# Patient Record
Sex: FEMALE | Race: WHITE | NOT HISPANIC OR LATINO | URBAN - METROPOLITAN AREA
[De-identification: names, ages, dates, MRNs, and addresses within clinical notes are randomized per-mention and may not be internally consistent; named-entity substitution may affect disease eponyms.]

---

## 2017-03-10 ENCOUNTER — OFFICE VISIT - HEALTHEAST (OUTPATIENT)
Dept: FAMILY MEDICINE | Facility: CLINIC | Age: 47
End: 2017-03-10

## 2017-03-10 DIAGNOSIS — J32.9 SINUSITIS: ICD-10-CM

## 2017-03-10 ASSESSMENT — MIFFLIN-ST. JEOR: SCORE: 1278.02

## 2017-03-16 ENCOUNTER — OFFICE VISIT - HEALTHEAST (OUTPATIENT)
Dept: FAMILY MEDICINE | Facility: CLINIC | Age: 47
End: 2017-03-16

## 2017-03-16 DIAGNOSIS — Z00.00 ROUTINE GENERAL MEDICAL EXAMINATION AT A HEALTH CARE FACILITY: ICD-10-CM

## 2017-03-16 DIAGNOSIS — Z13.1 DIABETES MELLITUS SCREENING: ICD-10-CM

## 2017-03-16 DIAGNOSIS — Z13.220 SCREENING CHOLESTEROL LEVEL: ICD-10-CM

## 2017-03-16 LAB
CHOLEST SERPL-MCNC: 189 MG/DL
FASTING STATUS PATIENT QL REPORTED: YES
HDLC SERPL-MCNC: 62 MG/DL
LDLC SERPL CALC-MCNC: 108 MG/DL
TRIGL SERPL-MCNC: 95 MG/DL

## 2017-03-16 ASSESSMENT — MIFFLIN-ST. JEOR: SCORE: 1282.56

## 2018-09-19 ENCOUNTER — OFFICE VISIT - HEALTHEAST (OUTPATIENT)
Dept: FAMILY MEDICINE | Facility: CLINIC | Age: 48
End: 2018-09-19

## 2018-09-19 DIAGNOSIS — G47.00 INSOMNIA: ICD-10-CM

## 2018-09-19 DIAGNOSIS — F32.0 MILD MAJOR DEPRESSION (H): ICD-10-CM

## 2018-09-19 DIAGNOSIS — Z00.00 ROUTINE GENERAL MEDICAL EXAMINATION AT A HEALTH CARE FACILITY: ICD-10-CM

## 2018-09-19 DIAGNOSIS — F41.9 ANXIETY: ICD-10-CM

## 2018-09-19 LAB
CHOLEST SERPL-MCNC: 182 MG/DL
FASTING STATUS PATIENT QL REPORTED: YES
FASTING STATUS PATIENT QL REPORTED: YES
GLUCOSE BLD-MCNC: 78 MG/DL (ref 70–99)
HDLC SERPL-MCNC: 56 MG/DL
LDLC SERPL CALC-MCNC: 110 MG/DL
TRIGL SERPL-MCNC: 78 MG/DL
TSH SERPL DL<=0.005 MIU/L-ACNC: 1.79 UIU/ML (ref 0.3–5)

## 2018-09-19 ASSESSMENT — MIFFLIN-ST. JEOR: SCORE: 1284.83

## 2018-09-20 LAB
HPV SOURCE: NORMAL
HUMAN PAPILLOMA VIRUS 16 DNA: NEGATIVE
HUMAN PAPILLOMA VIRUS 18 DNA: NEGATIVE
HUMAN PAPILLOMA VIRUS FINAL DIAGNOSIS: NORMAL
HUMAN PAPILLOMA VIRUS OTHER HR: NEGATIVE
SPECIMEN DESCRIPTION: NORMAL

## 2018-09-28 LAB
BKR LAB AP ABNORMAL BLEEDING: NO
BKR LAB AP BIRTH CONTROL/HORMONES: NORMAL
BKR LAB AP CERVICAL APPEARANCE: NORMAL
BKR LAB AP GYN ADEQUACY: NORMAL
BKR LAB AP GYN INTERPRETATION: NORMAL
BKR LAB AP HPV REFLEX: NORMAL
BKR LAB AP LMP: NORMAL
BKR LAB AP PATIENT STATUS: NORMAL
BKR LAB AP PREVIOUS ABNORMAL: NORMAL
BKR LAB AP PREVIOUS NORMAL: 2013
HIGH RISK?: NO
PATH REPORT.COMMENTS IMP SPEC: NORMAL
RESULT FLAG (HE HISTORICAL CONVERSION): NORMAL

## 2018-10-08 ENCOUNTER — HOSPITAL ENCOUNTER (OUTPATIENT)
Dept: MAMMOGRAPHY | Facility: CLINIC | Age: 48
Discharge: HOME | End: 2018-10-08
Attending: FAMILY MEDICINE

## 2018-10-08 DIAGNOSIS — Z12.31 VISIT FOR SCREENING MAMMOGRAM: ICD-10-CM

## 2018-10-16 ENCOUNTER — OFFICE VISIT - HEALTHEAST (OUTPATIENT)
Dept: FAMILY MEDICINE | Facility: CLINIC | Age: 48
End: 2018-10-16

## 2018-10-16 DIAGNOSIS — G47.00 INSOMNIA: ICD-10-CM

## 2018-10-16 DIAGNOSIS — F32.0 MILD MAJOR DEPRESSION (H): ICD-10-CM

## 2018-10-16 DIAGNOSIS — F41.9 ANXIETY: ICD-10-CM

## 2018-11-14 ENCOUNTER — OFFICE VISIT - HEALTHEAST (OUTPATIENT)
Dept: FAMILY MEDICINE | Facility: CLINIC | Age: 48
End: 2018-11-14

## 2018-11-14 DIAGNOSIS — F51.01 PRIMARY INSOMNIA: ICD-10-CM

## 2018-11-14 DIAGNOSIS — F32.0 MILD MAJOR DEPRESSION (H): ICD-10-CM

## 2018-11-14 DIAGNOSIS — F41.9 ANXIETY: ICD-10-CM

## 2018-12-27 ENCOUNTER — COMMUNICATION - HEALTHEAST (OUTPATIENT)
Dept: FAMILY MEDICINE | Facility: CLINIC | Age: 48
End: 2018-12-27

## 2019-02-13 ENCOUNTER — OFFICE VISIT - HEALTHEAST (OUTPATIENT)
Dept: FAMILY MEDICINE | Facility: CLINIC | Age: 49
End: 2019-02-13

## 2019-02-13 DIAGNOSIS — F51.01 PRIMARY INSOMNIA: ICD-10-CM

## 2019-02-13 DIAGNOSIS — F41.9 ANXIETY: ICD-10-CM

## 2019-02-13 DIAGNOSIS — F32.0 MILD MAJOR DEPRESSION (H): ICD-10-CM

## 2019-03-06 ENCOUNTER — COMMUNICATION - HEALTHEAST (OUTPATIENT)
Dept: FAMILY MEDICINE | Facility: CLINIC | Age: 49
End: 2019-03-06

## 2019-03-06 DIAGNOSIS — F41.9 ANXIETY: ICD-10-CM

## 2019-03-06 DIAGNOSIS — F32.0 MILD MAJOR DEPRESSION (H): ICD-10-CM

## 2020-03-26 ENCOUNTER — COMMUNICATION - HEALTHEAST (OUTPATIENT)
Dept: FAMILY MEDICINE | Facility: CLINIC | Age: 50
End: 2020-03-26

## 2020-03-26 DIAGNOSIS — F41.9 ANXIETY: ICD-10-CM

## 2020-03-26 DIAGNOSIS — F32.0 MILD MAJOR DEPRESSION (H): ICD-10-CM

## 2020-04-13 ENCOUNTER — COMMUNICATION - HEALTHEAST (OUTPATIENT)
Dept: FAMILY MEDICINE | Facility: CLINIC | Age: 50
End: 2020-04-13

## 2020-04-14 ENCOUNTER — OFFICE VISIT - HEALTHEAST (OUTPATIENT)
Dept: FAMILY MEDICINE | Facility: CLINIC | Age: 50
End: 2020-04-14

## 2020-04-14 DIAGNOSIS — F32.0 MILD MAJOR DEPRESSION (H): ICD-10-CM

## 2020-04-14 DIAGNOSIS — F41.9 ANXIETY: ICD-10-CM

## 2020-04-14 DIAGNOSIS — F51.01 PRIMARY INSOMNIA: ICD-10-CM

## 2020-04-14 ASSESSMENT — ANXIETY QUESTIONNAIRES
IF YOU CHECKED OFF ANY PROBLEMS ON THIS QUESTIONNAIRE, HOW DIFFICULT HAVE THESE PROBLEMS MADE IT FOR YOU TO DO YOUR WORK, TAKE CARE OF THINGS AT HOME, OR GET ALONG WITH OTHER PEOPLE: NOT DIFFICULT AT ALL
6. BECOMING EASILY ANNOYED OR IRRITABLE: SEVERAL DAYS
5. BEING SO RESTLESS THAT IT IS HARD TO SIT STILL: NOT AT ALL
7. FEELING AFRAID AS IF SOMETHING AWFUL MIGHT HAPPEN: NOT AT ALL
4. TROUBLE RELAXING: NOT AT ALL
3. WORRYING TOO MUCH ABOUT DIFFERENT THINGS: SEVERAL DAYS
1. FEELING NERVOUS, ANXIOUS, OR ON EDGE: MORE THAN HALF THE DAYS
GAD7 TOTAL SCORE: 5
2. NOT BEING ABLE TO STOP OR CONTROL WORRYING: SEVERAL DAYS

## 2020-04-14 ASSESSMENT — PATIENT HEALTH QUESTIONNAIRE - PHQ9: SUM OF ALL RESPONSES TO PHQ QUESTIONS 1-9: 3

## 2021-03-25 ENCOUNTER — COMMUNICATION - HEALTHEAST (OUTPATIENT)
Dept: FAMILY MEDICINE | Facility: CLINIC | Age: 51
End: 2021-03-25

## 2021-04-05 ENCOUNTER — COMMUNICATION - HEALTHEAST (OUTPATIENT)
Dept: FAMILY MEDICINE | Facility: CLINIC | Age: 51
End: 2021-04-05

## 2021-04-05 ENCOUNTER — OFFICE VISIT - HEALTHEAST (OUTPATIENT)
Dept: FAMILY MEDICINE | Facility: CLINIC | Age: 51
End: 2021-04-05

## 2021-04-05 DIAGNOSIS — F32.0 MILD MAJOR DEPRESSION (H): ICD-10-CM

## 2021-04-05 DIAGNOSIS — Z12.11 COLON CANCER SCREENING: ICD-10-CM

## 2021-04-05 DIAGNOSIS — F41.9 ANXIETY: ICD-10-CM

## 2021-04-05 DIAGNOSIS — F51.01 PRIMARY INSOMNIA: ICD-10-CM

## 2021-04-05 ASSESSMENT — ANXIETY QUESTIONNAIRES
1. FEELING NERVOUS, ANXIOUS, OR ON EDGE: MORE THAN HALF THE DAYS
GAD7 TOTAL SCORE: 11
IF YOU CHECKED OFF ANY PROBLEMS ON THIS QUESTIONNAIRE, HOW DIFFICULT HAVE THESE PROBLEMS MADE IT FOR YOU TO DO YOUR WORK, TAKE CARE OF THINGS AT HOME, OR GET ALONG WITH OTHER PEOPLE: EXTREMELY DIFFICULT
3. WORRYING TOO MUCH ABOUT DIFFERENT THINGS: NEARLY EVERY DAY
7. FEELING AFRAID AS IF SOMETHING AWFUL MIGHT HAPPEN: NOT AT ALL
4. TROUBLE RELAXING: SEVERAL DAYS
2. NOT BEING ABLE TO STOP OR CONTROL WORRYING: MORE THAN HALF THE DAYS
5. BEING SO RESTLESS THAT IT IS HARD TO SIT STILL: NOT AT ALL
6. BECOMING EASILY ANNOYED OR IRRITABLE: NEARLY EVERY DAY

## 2021-04-05 ASSESSMENT — PATIENT HEALTH QUESTIONNAIRE - PHQ9: SUM OF ALL RESPONSES TO PHQ QUESTIONS 1-9: 6

## 2021-04-13 ENCOUNTER — OFFICE VISIT - HEALTHEAST (OUTPATIENT)
Dept: FAMILY MEDICINE | Facility: CLINIC | Age: 51
End: 2021-04-13

## 2021-04-13 DIAGNOSIS — F41.9 ANXIETY: ICD-10-CM

## 2021-04-13 DIAGNOSIS — N95.1 MENOPAUSAL SYNDROME (HOT FLASHES): ICD-10-CM

## 2021-04-13 DIAGNOSIS — F32.0 MILD MAJOR DEPRESSION (H): ICD-10-CM

## 2021-04-13 DIAGNOSIS — F51.01 PRIMARY INSOMNIA: ICD-10-CM

## 2021-04-13 DIAGNOSIS — Z00.00 ROUTINE GENERAL MEDICAL EXAMINATION AT A HEALTH CARE FACILITY: ICD-10-CM

## 2021-04-13 LAB
CHOLEST SERPL-MCNC: 198 MG/DL
FASTING STATUS PATIENT QL REPORTED: YES
FASTING STATUS PATIENT QL REPORTED: YES
GLUCOSE BLD-MCNC: 78 MG/DL (ref 70–125)
HDLC SERPL-MCNC: 67 MG/DL
LDLC SERPL CALC-MCNC: 116 MG/DL
TRIGL SERPL-MCNC: 74 MG/DL

## 2021-04-13 ASSESSMENT — ANXIETY QUESTIONNAIRES
4. TROUBLE RELAXING: NOT AT ALL
2. NOT BEING ABLE TO STOP OR CONTROL WORRYING: NOT AT ALL
1. FEELING NERVOUS, ANXIOUS, OR ON EDGE: SEVERAL DAYS
GAD7 TOTAL SCORE: 3
7. FEELING AFRAID AS IF SOMETHING AWFUL MIGHT HAPPEN: NOT AT ALL
3. WORRYING TOO MUCH ABOUT DIFFERENT THINGS: SEVERAL DAYS
6. BECOMING EASILY ANNOYED OR IRRITABLE: SEVERAL DAYS
5. BEING SO RESTLESS THAT IT IS HARD TO SIT STILL: NOT AT ALL
IF YOU CHECKED OFF ANY PROBLEMS ON THIS QUESTIONNAIRE, HOW DIFFICULT HAVE THESE PROBLEMS MADE IT FOR YOU TO DO YOUR WORK, TAKE CARE OF THINGS AT HOME, OR GET ALONG WITH OTHER PEOPLE: NOT DIFFICULT AT ALL

## 2021-04-13 ASSESSMENT — PATIENT HEALTH QUESTIONNAIRE - PHQ9: SUM OF ALL RESPONSES TO PHQ QUESTIONS 1-9: 6

## 2021-04-13 ASSESSMENT — MIFFLIN-ST. JEOR: SCORE: 1243.16

## 2021-04-29 ENCOUNTER — RECORDS - HEALTHEAST (OUTPATIENT)
Dept: ADMINISTRATIVE | Facility: OTHER | Age: 51
End: 2021-04-29

## 2021-05-05 ENCOUNTER — COMMUNICATION - HEALTHEAST (OUTPATIENT)
Dept: FAMILY MEDICINE | Facility: CLINIC | Age: 51
End: 2021-05-05

## 2021-05-05 DIAGNOSIS — F51.01 PRIMARY INSOMNIA: ICD-10-CM

## 2021-05-05 DIAGNOSIS — N95.1 MENOPAUSAL SYNDROME (HOT FLASHES): ICD-10-CM

## 2021-05-12 ENCOUNTER — HOSPITAL ENCOUNTER (OUTPATIENT)
Dept: MAMMOGRAPHY | Facility: CLINIC | Age: 51
Discharge: HOME | End: 2021-05-12

## 2021-05-12 DIAGNOSIS — Z12.31 VISIT FOR SCREENING MAMMOGRAM: ICD-10-CM

## 2021-05-27 ASSESSMENT — PATIENT HEALTH QUESTIONNAIRE - PHQ9
SUM OF ALL RESPONSES TO PHQ QUESTIONS 1-9: 6
SUM OF ALL RESPONSES TO PHQ QUESTIONS 1-9: 6
SUM OF ALL RESPONSES TO PHQ QUESTIONS 1-9: 3

## 2021-05-28 ASSESSMENT — ANXIETY QUESTIONNAIRES
GAD7 TOTAL SCORE: 3
GAD7 TOTAL SCORE: 11
GAD7 TOTAL SCORE: 5

## 2021-05-30 VITALS — HEIGHT: 63 IN | WEIGHT: 151 LBS | BODY MASS INDEX: 26.75 KG/M2

## 2021-05-30 VITALS — BODY MASS INDEX: 25.8 KG/M2 | HEIGHT: 64 IN | WEIGHT: 151.13 LBS

## 2021-06-02 ENCOUNTER — RECORDS - HEALTHEAST (OUTPATIENT)
Dept: ADMINISTRATIVE | Facility: CLINIC | Age: 51
End: 2021-06-02

## 2021-06-02 ENCOUNTER — COMMUNICATION - HEALTHEAST (OUTPATIENT)
Dept: FAMILY MEDICINE | Facility: CLINIC | Age: 51
End: 2021-06-02

## 2021-06-02 VITALS — WEIGHT: 150.25 LBS | BODY MASS INDEX: 26.41 KG/M2

## 2021-06-02 VITALS — HEIGHT: 63 IN | BODY MASS INDEX: 27.02 KG/M2 | WEIGHT: 152.5 LBS

## 2021-06-02 VITALS — BODY MASS INDEX: 26.03 KG/M2 | WEIGHT: 148.13 LBS

## 2021-06-02 VITALS — BODY MASS INDEX: 26.01 KG/M2 | WEIGHT: 148 LBS

## 2021-06-02 DIAGNOSIS — F32.0 MILD MAJOR DEPRESSION (H): ICD-10-CM

## 2021-06-02 DIAGNOSIS — F41.9 ANXIETY: ICD-10-CM

## 2021-06-03 RX ORDER — VENLAFAXINE HYDROCHLORIDE 75 MG/1
CAPSULE, EXTENDED RELEASE ORAL
Qty: 90 CAPSULE | Refills: 0 | Status: SHIPPED | OUTPATIENT
Start: 2021-06-03 | End: 2021-08-09

## 2021-06-05 VITALS
HEART RATE: 59 BPM | SYSTOLIC BLOOD PRESSURE: 116 MMHG | HEIGHT: 63 IN | OXYGEN SATURATION: 100 % | WEIGHT: 144.19 LBS | BODY MASS INDEX: 25.55 KG/M2 | DIASTOLIC BLOOD PRESSURE: 80 MMHG

## 2021-06-07 NOTE — TELEPHONE ENCOUNTER
Who is calling:  Patient      Reason for Call:  Calling to confirm the TYPE of visit that will be conducted tomorrow, If video - Patient states , she has no idea how to link to that . Schedule states both video and televisit thus the confusion .   Please call patient     Date of last appointment with primary care:   Na    Okay to leave a detailed message: Yes

## 2021-06-07 NOTE — TELEPHONE ENCOUNTER
Reached out to patient and informed her she will receive a phone call prior to her appointment, and she will be given directions. No additional questions at this time. Frances Landers

## 2021-06-07 NOTE — PROGRESS NOTES
"Robyn Cross is a 50 y.o. female who is being evaluated via a billable telephone visit.      The patient has been notified of following:     \"This telephone visit will be conducted via a call between you and your physician/provider. We have found that certain health care needs can be provided without the need for a physical exam.  This service lets us provide the care you need with a short phone conversation.  If a prescription is necessary we can send it directly to your pharmacy.  If lab work is needed we can place an order for that and you can then stop by our lab to have the test done at a later time.    Telephone visits are billed at different rates depending on your insurance coverage. During this emergency period, for some insurers they may be billed the same as an in-person visit.  Please reach out to your insurance provider with any questions.    If during the course of the call the physician/provider feels a telephone visit is not appropriate, you will not be charged for this service.\"    Patient has given verbal consent to a Telephone visit? Yes    Additional provider notes:    Has not refilled med for couple months because was doing well  Until COVID19  Father passed away in January  Since then has been more anxious and not sleeping well  Called pharmacy and was able to get one month supply.  Has started taking it again and asking refills    Little interest or pleasure in doing things: Not at all  Feeling down, depressed, or hopeless: Not at all  Trouble falling or staying asleep, or sleeping too much: More than half the days  Feeling tired or having little energy: Not at all  Poor appetite or overeating: Several days  Feeling bad about yourself - or that you are a failure or have let yourself or your family down: Not at all  Trouble concentrating on things, such as reading the newspaper or watching television: Not at all  Moving or speaking so slowly that other people could have noticed. Or the " opposite - being so fidgety or restless that you have been moving around a lot more than usual: Not at all  Thoughts that you would be better off dead, or of hurting yourself in some way: Not at all  PHQ-9 Total Score: 3  If you checked off any problems, how difficult have these problems made it for you to do your work, take care of things at home, or get along with other people?: Not difficult at all    DONTAE-7 Screening Results:  Feeling nervous, anxious or on edge: 2 (2020  2:00 PM)  Not being able to stop or control worry: 1 (2020  2:00 PM)  Worrying too much about different things: 1 (2020  2:00 PM)  Trouble relaxin (2020  2:00 PM)  Being so restless that is is hard to sit still: 0 (2020  2:00 PM)  Becoming easily annnoyed or irritable: 1 (2020  2:00 PM)  Feeling afraid as if something awful might happen: 0 (2020  2:00 PM)  DONTAE-7 Total: 5 (2020  2:00 PM)  How difficult did these problems make it for you to do your work, take care of things at home or get along with other people? : Not difficult at all (2020  2:00 PM)  How difficult did these problems make it for you to do your work, take care of things at home or get along with other people? : Not difficult at all (2020  2:00 PM)      Assessment/Plan:  1. Mild major depression (H)  Refilled  Motivational interviewing was utilized today.  Modified cognitive behavioral therapy was performed with counseling on internal locus of control.   - venlafaxine (EFFEXOR-XR) 75 MG 24 hr capsule; TAKE 1 CAPSULE BY MOUTH  EVERY DAY  Dispense: 90 capsule; Refill: 3    2. Anxiety  Refilled  Motivational interviewing was utilized today.  Modified cognitive behavioral therapy was performed with counseling on internal locus of control.   - venlafaxine (EFFEXOR-XR) 75 MG 24 hr capsule; TAKE 1 CAPSULE BY MOUTH  EVERY DAY  Dispense: 90 capsule; Refill: 3    3. Primary insomnia  Continue with melatonin      Phone call duration:  25  minutes    Rufino Randle, CMA

## 2021-06-07 NOTE — TELEPHONE ENCOUNTER
Left message to call back for: Robyn   Information to relay to patient:  Please assist the patient with a telephone visit.  Thank you

## 2021-06-07 NOTE — TELEPHONE ENCOUNTER
RN cannot approve Refill Request    RN can NOT refill this medication Protocol failed and NO refill given. Last office visit: 2/13/2019 Dave Reis MD Last Physical: 9/19/2018 Last MTM visit: Visit date not found Last visit same specialty: 2/13/2019 Dave Reis MD.  Next visit within 3 mo: Visit date not found  Next physical within 3 mo: Visit date not found      Rhea Sanchez, [facility] Connection Triage/Med Refill 3/27/2020    Requested Prescriptions   Pending Prescriptions Disp Refills     venlafaxine (EFFEXOR-XR) 75 MG 24 hr capsule  0     Sig: Take by mouth daily.       Venlafaxine/Desvenlafaxine Refill Protocol Failed - 3/26/2020  8:20 AM        Failed - LFT or AST or ALT in last year     No results found for: ALBUMIN, BILITOT, BILIDIR, ALKPHOS, AST, ALT, PROT             Failed - Fasting lipid cascade in last year     Cholesterol   Date Value Ref Range Status   09/19/2018 182 <=199 mg/dL Final     Triglycerides   Date Value Ref Range Status   09/19/2018 78 <=149 mg/dL Final     HDL Cholesterol   Date Value Ref Range Status   09/19/2018 56 >=50 mg/dL Final     LDL Calculated   Date Value Ref Range Status   09/19/2018 110 <=129 mg/dL Final     Patient Fasting > 8hrs?   Date Value Ref Range Status   09/19/2018 Yes  Final   09/19/2018 Yes  Final             Failed - PCP or prescribing provider visit in last year     Last office visit with prescriber/PCP: 2/13/2019 Dave Reis MD OR same dept: Visit date not found OR same specialty: 2/13/2019 Dave Resi MD  Last physical: 9/19/2018 Last MTM visit: Visit date not found   Next visit within 3 mo: Visit date not found  Next physical within 3 mo: Visit date not found  Prescriber OR PCP: Dave Moss MD  Last diagnosis associated with med order: 1. Mild major depression (H)  - venlafaxine (EFFEXOR-XR) 75 MG 24 hr capsule; Take by mouth daily.; Refill: 0    2. Anxiety  - venlafaxine  (EFFEXOR-XR) 75 MG 24 hr capsule; Take by mouth daily.; Refill: 0    If protocol passes may refill for 12 months if within 3 months of last provider visit (or a total of 15 months).             Failed - Blood Pressure in last year     BP Readings from Last 1 Encounters:   02/13/19 110/64

## 2021-06-09 NOTE — PROGRESS NOTES
"Assessment/Plan:        Diagnoses and all orders for this visit:    Sinusitis    Other orders  -     azithromycin (ZITHROMAX Z-MOE) 250 MG tablet; Take 2 tablets (500 mg) on  Day 1,  followed by 1 tablet (250 mg) once daily on Days 2 through 5.  Dispense: 6 tablet; Refill: 0        We will start her on Z-Moe.  Discussed about medication use and side effects.  Increase yogurt intake.  Percussion measures, fluid hydration.  Recheck with PCP in a week if persistent or worse.  She was agreeable with the plans.  Subjective:    Patient ID: Robyn Cross is a 47 y.o. female.    HPI    Robyn is here with concerns about sinus infection.  She started with a chest congestion around 5 weeks ago, cough.  It then spread to her nose and is now congested.  Productive of yellowish to greenish secretions.  Denies any exposure to anyone ill that she is aware of.  She works in the school.  She denies any recent travel.  No shortness of breath.  No hemoptysis.  Ears feels clogged especially in the past 4 weeks.  She tried Tylenol Cold and flu, TheraFlu at night.  She also has allergies during the summer and uses Neti pot intermittently.  She tries to hydrate herself well.  Denies any history of smoking.  She felt feverish, chilled when it started.  It lasted for 3 days.    Review of Systems  As above otherwise negative.        Objective:    Physical Exam  Visit Vitals     /70 (Patient Site: Left Arm, Patient Position: Sitting, Cuff Size: Adult Regular)     Pulse 64     Temp 98.8  F (37.1  C) (Oral)     Ht 5' 3.25\" (1.607 m)     Wt 151 lb (68.5 kg)     LMP 02/20/2017 (Approximate)     SpO2 99%     Breastfeeding No     BMI 26.54 kg/m2       Vital signs noted above. AAO ×3.  HEENT no nasal discharge, moist oral mucosa. Ears:TMs intact bilateral, no fluid collection. Neck: Supple neck, nonpalpable cervical lymph nodes.  Lungs: Clear to auscultation bilateral.  Heart: S1-S2 regular rate and rhythm, no murmurs were noted.  " Abdomen:  with bowel sounds.  Extremities: No edema, pulses were full and equal.

## 2021-06-09 NOTE — PROGRESS NOTES
ASSESSMENT/PLAN:  1. Routine general medical examination at a health care facility  We discussed healthy lifestyle, nutrition, cardiovascular risk reduction, self care, safety, sunscreen, and seatbelt screening.  Health maintenance screening and immunizations reviewed with the patient.    2. Screening cholesterol level  - Lipid Cascade    3. Diabetes mellitus screening  - Glucose    4.  Sinusitis  Completed antibiotics and about 50% better  Continue with supportive cares and call if with questions    SUBJECTIVE:  Robyn Cross is a 47 y.o. female who is here for a health maintenance visit. Her blood pressure and pulse are within normal limits. Her BMI is 26. Her PAP smear is up to date. Due for a mammogram. She is fasting today.     Sinusitis: She was seen last week for a sinus infection, and was given azithromycin. She finished these, but does not feel completely better. She feels about 50% better. Her ears still feel clogged, and she feels like her throat is swollen, and she feels this while swallowing. She is also still have a significant amount of post nasal drip. She says that her congestion and rhinorrhea and sneezing is better. No body aches, fevers or chills. She did have some diarrhea on the antibiotics, but otherwise no nausea or stomach pain. No cough or sore throat. She does have allergies, and was unsure if her symptoms were allergies or infectious. 4-5 weeks ago, she had some chest congestion with coughing and sneezing, and she thinks that this went to sinuses. She does not take a daily allergy medication, and just uses a Neti pot. Her allergies are usually flared up in the spring. She does note that her coworkers have been ill. She feels like her episode of shingles has caused her some immunosuppression.     REVIEW OF SYSTEMS:   She has some moles and skin spots on her chest that can be irritated. She feels like they are irritated from her bra line.    All other systems are negative.    PFSH:  No  "new history.     History   Smoking Status     Never Smoker   Smokeless Tobacco     Never Used       Family History   Problem Relation Age of Onset     Heart disease Father      CABG Father      Colon cancer Maternal Grandmother      Esophageal cancer Maternal Grandfather      Alzheimer's disease Paternal Grandmother        Past Surgical History:   Procedure Laterality Date     NM HYSTEROSCOPY,W/ENDOMETRIAL ABLATION      Description: Hysteroscopy With Endometrial Ablation;  Recorded: 07/22/2010;  Comments: 2005     WRIST SURGERY         No Known Allergies      OBJECTIVE:   Physical Exam   Vitals:    03/16/17 0902   BP: 110/74   Patient Site: Left Arm   Patient Position: Sitting   Cuff Size: Adult Regular   Pulse: 64   Weight: 151 lb 2 oz (68.5 kg)   Height: 5' 3.5\" (1.613 m)     Estimated body mass index is 26.35 kg/(m^2) as calculated from the following:    Height as of this encounter: 5' 3.5\" (1.613 m).    Weight as of this encounter: 151 lb 2 oz (68.5 kg).    Constitutional: Patient is oriented to person, place, and time. Patient appears well-developed and well-nourished. No distress.   Head: Normocephalic and atraumatic.   Right Ear: External ear normal. Ear canal and TM normal.   Left Ear: External ear normal. Ear canal and TM normal.   Nose: Nose normal.   Mouth/Throat: Oropharynx is clear and moist. No oropharyngeal exudate.   Eyes: Conjunctivae and EOM are normal. Pupils are equal, round, and reactive to light. Right eye exhibits no discharge. Left eye exhibits no discharge. No scleral icterus.   Neck: Neck supple. No JVD present. No tracheal deviation present. No thyromegaly present. No carotid bruits.   Breasts:  Normal appearing, no skin involvement, no palpable mass, no tenderness on palpation.  No axillary involvement  Cardiovascular: Normal rate, regular rhythm, normal heart sounds and intact distal pulses. No murmur heard.   Pulmonary/Chest: Effort normal and breath sounds normal. No stridor. No " respiratory distress. Patient has no wheezes, no rales, exhibits no tenderness.   Abdominal: Soft. Bowel sounds are normal. Patient exhibits no distension and no mass. There is no tenderness. There is no rebound and no guarding.   Lymphadenopathy:  Patient has no cervical adenopathy.   Neurological: Patient is alert and oriented to person, place, and time. Patient has normal reflexes. No cranial nerve deficit. Coordination normal.   Skin: Skin is warm and dry. No rash noted. Patient is not diaphoretic. No erythema. No pallor.   Psychiatric: Patient has good eye contact without any psychomotor retardation or stereotypic behaviors.  normal mood and affect. Judgment and thought content normal.   Speech is regular rate and rhythm.       QUALITY MEASURES:  The following high BMI interventions were performed this visit: encouragement to exercise and lifestyle education regarding diet    ADDITIONAL HISTORY SUMMARIZED (2): Reviewed note from Dr. Small from 3/10/2017 regarding sinusitis.  DECISION TO OBTAIN EXTRA INFORMATION (1): None.   RADIOLOGY TESTS (1): None.  LABS (1): Ordered labs today.  MEDICINE TESTS (1): None.  INDEPENDENT REVIEW (2 each): None.     The visit lasted a total of 12 minutes face to face with the patient. Over 50% of the time was spent counseling and educating the patient about health maintenance.    I, Aline Luong, am scribing for and in the presence of, Dr. Brink.    I, Dr. Brink, personally performed the services described in this documentation, as scribed by Aline Luong in my presence, and it is both accurate and complete.      MEDICATIONS:  No current outpatient prescriptions on file.     No current facility-administered medications for this visit.          Total data points: 3

## 2021-06-16 PROBLEM — F32.0 MILD MAJOR DEPRESSION (H): Status: ACTIVE | Noted: 2018-11-14

## 2021-06-16 PROBLEM — F41.9 ANXIETY: Status: ACTIVE | Noted: 2018-11-14

## 2021-06-16 PROBLEM — F51.01 PRIMARY INSOMNIA: Status: ACTIVE | Noted: 2018-11-14

## 2021-06-16 NOTE — TELEPHONE ENCOUNTER
RN cannot approve Refill Request    RN can NOT refill this medication Protocol failed and NO refill given. Last office visit: 2/13/2019 Dave Reis MD Last Physical: 9/19/2018 Last MTM visit: Visit date not found Last visit same specialty: 2/13/2019 Dave Reis MD.  Next visit within 3 mo: Visit date not found  Next physical within 3 mo: Visit date not found      Christian Schulz, [facility] Triage/Med Refill 4/6/2021    Requested Prescriptions   Pending Prescriptions Disp Refills     venlafaxine (EFFEXOR-XR) 75 MG 24 hr capsule 90 capsule 0     Sig: TAKE 1 CAPSULE BY MOUTH  EVERY DAY       Venlafaxine/Desvenlafaxine Refill Protocol Failed - 4/5/2021 10:03 AM        Failed - LFT or AST or ALT in last year     No results found for: ALBUMIN, BILITOT, BILIDIR, ALKPHOS, AST, ALT, PROT             Failed - Fasting lipid cascade in last year     Cholesterol   Date Value Ref Range Status   09/19/2018 182 <=199 mg/dL Final     Triglycerides   Date Value Ref Range Status   09/19/2018 78 <=149 mg/dL Final     HDL Cholesterol   Date Value Ref Range Status   09/19/2018 56 >=50 mg/dL Final     LDL Calculated   Date Value Ref Range Status   09/19/2018 110 <=129 mg/dL Final     Patient Fasting > 8hrs?   Date Value Ref Range Status   09/19/2018 Yes  Final   09/19/2018 Yes  Final             Failed - Blood Pressure in last year     BP Readings from Last 1 Encounters:   02/13/19 110/64             Passed - PCP or prescribing provider visit in last year     Last office visit with prescriber/PCP: 2/13/2019 Dave Reis MD OR same dept: Visit date not found OR same specialty: 2/13/2019 Dave Reis MD  Last physical: 9/19/2018 Last MTM visit: Visit date not found   Next visit within 3 mo: Visit date not found  Next physical within 3 mo: Visit date not found  Prescriber OR PCP: Dave Moss MD  Last diagnosis associated with med order: 1. Mild major  depression (H)  - venlafaxine (EFFEXOR-XR) 75 MG 24 hr capsule; TAKE 1 CAPSULE BY MOUTH  EVERY DAY  Dispense: 90 capsule; Refill: 0    2. Anxiety  - venlafaxine (EFFEXOR-XR) 75 MG 24 hr capsule; TAKE 1 CAPSULE BY MOUTH  EVERY DAY  Dispense: 90 capsule; Refill: 0    If protocol passes may refill for 12 months if within 3 months of last provider visit (or a total of 15 months).

## 2021-06-16 NOTE — PROGRESS NOTES
Robyn Cross is a 51 y.o. female who is being evaluated via a billable video visit.      How would you like to obtain your AVS? MyChart.    Will anyone else be joining your video visit? No      Video Start Time: 7:35 AM    Assessment & Plan     Robyn was seen today for discucss med and medication refill.    Diagnoses and all orders for this visit:    Anxiety  Will restart back on effexor 75mg as she has been out of med for one month  Consider adding psychotherapy if still not better  Motivational interviewing was utilized today.  Modified cognitive behavioral therapy was performed with counseling on internal locus of control.  Discussed importance of self care, sleep, nutrition, hydration, exercise, and mindfulness   F/u in 3 months  -     venlafaxine (EFFEXOR-XR) 75 MG 24 hr capsule; TAKE 1 CAPSULE BY MOUTH  EVERY DAY    Mild major depression (H)  Will restart back on effexor 75mg as she has been out of med for one month  Consider adding psychotherapy if still not better  Motivational interviewing was utilized today.  Modified cognitive behavioral therapy was performed with counseling on internal locus of control.  Discussed importance of self care, sleep, nutrition, hydration, exercise, and mindfulness   F/u in 3 months  -     venlafaxine (EFFEXOR-XR) 75 MG 24 hr capsule; TAKE 1 CAPSULE BY MOUTH  EVERY DAY    Primary insomnia  Continue with melatonin  May want to consider P4 for perimenopause transition    Colon cancer screening  -     Ambulatory referral for Colonoscopy    Family history of CAD  Recommend fasting labs    Dave Moss MD  [facility]   Robyn Cross is 51 y.o. and presents today for the following health issues   HPI     Feeling anxious since being out of meds for 1 month  Triggers are work  Gets irritated with  on a regular basis but most of her anxiety comes from work  Was also taking melatonin to help with sleep  because she felt effexor was affecting her sleep  Perimenopausal    Little interest or pleasure in doing things: Not at all  Feeling down, depressed, or hopeless: Not at all  Trouble falling or staying asleep, or sleeping too much: Nearly every day  Feeling tired or having little energy: More than half the days  Poor appetite or overeating: Not at all  Feeling bad about yourself - or that you are a failure or have let yourself or your family down: Not at all  Trouble concentrating on things, such as reading the newspaper or watching television: Several days  Moving or speaking so slowly that other people could have noticed. Or the opposite - being so fidgety or restless that you have been moving around a lot more than usual: Not at all  Thoughts that you would be better off dead, or of hurting yourself in some way: Not at all  PHQ-9 Total Score: 6  If you checked off any problems, how difficult have these problems made it for you to do your work, take care of things at home, or get along with other people?: Very difficult    DONTAE-7 Screening Results:  Feeling nervous, anxious or on edge: 2 (2021  7:00 AM)  Not being able to stop or control worry: 2 (2021  7:00 AM)  Worrying too much about different things: 3 (2021  7:00 AM)  Trouble relaxin (2021  7:00 AM)  Being so restless that is is hard to sit still: 0 (2021  7:00 AM)  Becoming easily annnoyed or irritable: 3 (2021  7:00 AM)  Feeling afraid as if something awful might happen: 0 (2021  7:00 AM)  DONTAE-7 Total: 11 (2021  7:00 AM)  How difficult did these problems make it for you to do your work, take care of things at home or get along with other people? : Extremely difficult (2021  7:00 AM)  How difficult did these problems make it for you to do your work, take care of things at home or get along with other people? : Extremely difficult (2021  7:00 AM)    Family history of heart problems  No personal symptoms of chest  pain, shortness of breath, palpitation  Not a smoker  Has physical in next few weeks but wanted to make sure that this is made aware      Objective       Vitals:  No vitals were obtained today due to virtual visit.    Physical Exam  Constitutional: Patient is oriented to person, place, and time. Patient appears well-developed and well-nourished. No distress.   Head: Normocephalic and atraumatic.   Right Ear: External ear normal.   Left Ear: External ear normal.   Eyes: Conjunctivae and EOM are normal. Right eye exhibits no discharge. Left eye exhibits no discharge. No scleral icterus.   Neurological: Patient is alert and oriented to person, place, and time.  Coordination normal.   Skin: No rash noted. Patient is not diaphoretic. No erythema. No pallor.    The patient has good eye contact.  No psychomotor retardation or stereotypical behaviors.  Speech was regular rate, regular rhythm, adequate responses.  Mood was stable and affect was congruent mood.  No suicidal or homicidal intent.  No hallucination.    Video-Visit Details    Type of service:  Video Visit    Video End Time (time video stopped): 8:10 AM  Originating Location (pt. Location): Home    Distant Location (provider location):  [facility]     Platform used for Video Visit: Other: [employer]

## 2021-06-20 NOTE — PROGRESS NOTES
FEMALE PREVENTATIVE EXAM    Assessment and Plan:     Routine general medical examination at a health care facility  We discussed healthy lifestyle, nutrition, cardiovascular risk reduction, self care, safety, sunscreen, seatbelt, and timing of cancer screening.  Health maintenance screening and immunizations reviewed with the patient.   -     Lipid Cascade  -     Thyroid Cascade  -     Glucose  -     Gynecologic Cytology (PAP Smear)    Mild major depression (H), anxiety  Will give effexor.  Because side effects.  Recommend psychotherapy.  She is not interested in psychotherapy at this time and will consider medication.  She will follow-up in 1 month.  -     venlafaxine (EFFEXOR XR) 37.5 MG 24 hr capsule; Take 1 capsule (37.5 mg total) by mouth daily.    Insomnia  She will continue with melatonin for now.  She thinks her insomnia is related to anxiety.  Will address the anxiety with Effexor.      Next follow up: One year for health maintenance exam.  1 month for depression and anxiety.    Immunization Review  Adult Imm Review: No immunizations due today  Due today, orders placed    I discussed the following with the patient:   Adult Healthy Living: Importance of regular exercise  Healthy nutrition      Subjective:   Chief Complaint: Robyn Cross is an 48 y.o. female here for a preventative health visit.     HPI:      Over the last several months the patient has been feeling more depressed, emotional, tired, difficulty with staying asleep, hot flashes, night sweats.  Her symptoms are worse before her menstrual cycles and during her menstrual cycles.  She continues to have normal monthly menstrual cycles however.  Last menstrual period was August 20, 2018.  Patient has never had any abnormal bleeding or history of abnormal Pap smear.  She has an issue in place for contraception.  On average she gets about 5-6 hours of sleep.  She thinks she falls asleep okay but does not stay asleep.  She has been under a lot of  stress.  She has a different position at work and increased work hours.  She thinks that she has very little directions at work has made it hard for her to understand her job.  She also does not have any desire for sex.  She wishes for improve intimate relationship with her .  PHQ9=11, GAD7=9    Little interest or pleasure in doing things: Several days  Feeling down, depressed, or hopeless: More than half the days  Trouble falling or staying asleep, or sleeping too much: Nearly every day  Feeling tired or having little energy: Nearly every day  Poor appetite or overeating: Several days  Feeling bad about yourself - or that you are a failure or have let yourself or your family down: Several days  Trouble concentrating on things, such as reading the newspaper or watching television: Not at all  Moving or speaking so slowly that other people could have noticed. Or the opposite - being so fidgety or restless that you have been moving around a lot more than usual: Not at all  Thoughts that you would be better off dead, or of hurting yourself in some way: Not at all  PHQ-9 Total Score: 11  If you checked off any problems, how difficult have these problems made it for you to do your work, take care of things at home, or get along with other people?: Somewhat difficult     Feeling nervous, anxious or on edge: 2 (2018  3:00 PM)  Not being able to stop or control worry: 1 (2018  3:00 PM)  Worrying too much about different things: 1 (2018  3:00 PM)  Trouble relaxin (2018  3:00 PM)  Being so restless that is is hard to sit still: 0 (2018  3:00 PM)  Becoming easily annnoyed or irritable: 3 (2018  3:00 PM)  Feeling afraid as if something awful might happen: 0 (2018  3:00 PM)  DONTAE-7 Total: 9 (2018  3:00 PM)  How difficult did these problems make it for you to do your work, take care of things at home or get along with other people? : Somewhat difficult (2018  3:00 PM)  How  "difficult did these problems make it for you to do your work, take care of things at home or get along with other people? : Somewhat difficult (9/19/2018  3:00 PM)      Healthy Habits  Are you taking a daily aspirin? No  Do you typically exercising at least 40 min, 3-4 times per week?  NO  Do you usually eat at least 4 servings of fruit and vegetables a day, include whole grains and fiber and avoid regularly eating high fat foods? Yes  Have you had an eye exam in the past two years? Yes  Do you see a dentist twice per year? NO  Do you have any concerns regarding sleep? YES    Safety Screen  If you own firearms, are they secured in a locked gun cabinet or with trigger locks? The patient does not own any firearms  No Data Recorded    Review of Systems:  Please see above.  The rest of the review of systems are negative for all systems.     Pap History:   Yes - updated in Problem List and Health Maintenance accordingly  Cancer Screening       Status Date      MAMMOGRAM Overdue 1971     PAP SMEAR Overdue 6/12/2018      Done 6/12/2013 GYNECOLOGIC CYTOLOGY (PAP SMEAR)          Patient Care Team:  Dave Moss MD as PCP - General (Family Medicine)        History     Reviewed By Date/Time Sections Reviewed    Rufino Randle CMA 9/19/2018  3:09 PM Tobacco            Objective:   Vital Signs:   Visit Vitals     /66 (Patient Site: Left Arm, Patient Position: Sitting, Cuff Size: Adult Regular)     Pulse 64     Ht 5' 3.25\" (1.607 m)     Wt 152 lb 8 oz (69.2 kg)     LMP 08/23/2018     BMI 26.8 kg/m2          PHYSICAL EXAM    Objective:    Physical Exam   Vitals:    09/19/18 1509   BP: 112/66   Pulse: 64      Constitutional: Patient is oriented to person, place, and time. Patient appears well-developed and well-nourished. No distress.   Head: Normocephalic and atraumatic.   Right Ear: External ear normal.   Left Ear: External ear normal.   Nose: Nose normal.   Mouth/Throat: Oropharynx is clear and moist. " No oropharyngeal exudate.   Eyes: Conjunctivae and EOM are normal. Pupils are equal, round, and reactive to light. Right eye exhibits no discharge. Left eye exhibits no discharge. No scleral icterus.   Neck: Neck supple. No JVD present. No tracheal deviation present. No thyromegaly present.   Cardiovascular: Normal rate, regular rhythm, normal heart sounds and intact distal pulses. No murmur heard.   Pulmonary/Chest: Effort normal and breath sounds normal. No stridor. No respiratory distress. Patient has no wheezes, no rales, exhibits no tenderness.   Abdominal: Soft. Bowel sounds are normal. Patient exhibits no distension and no mass. There is no tenderness. There is no rebound and no guarding.   Lymphadenopathy:  Patient has no cervical adenopathy.   Neurological: Patient is alert and oriented to person, place, and time. Patient has normal reflexes. No cranial nerve deficit. Coordination normal.   Skin: Skin is warm and dry. No rash noted. Patient is not diaphoretic. No erythema. No pallor.    Normal breast exam  Normal pelvic exam    The 10-year ASCVD risk score ([place] DC Jr, et al., 2013) is: 0.6%    Values used to calculate the score:      Age: 48 years      Sex: Female      Is Non- : No      Diabetic: No      Tobacco smoker: No      Systolic Blood Pressure: 112 mmHg      Is BP treated: No      HDL Cholesterol: 62 mg/dL      Total Cholesterol: 189 mg/dL         Medication List          These changes are accurate as of 9/19/18  4:08 PM.  If you have any questions, ask your nurse or doctor.               START taking these medications          venlafaxine 37.5 MG 24 hr capsule   Also known as:  EFFEXOR XR   INSTRUCTIONS:  Take 1 capsule (37.5 mg total) by mouth daily.   Started by:  Dave Moss MD                Where to Get Your Medications      These medications were sent to [place] [address] [place] [address]  [address] [facility] [address]      Phone:  [phone]      venlafaxine 37.5 MG 24 hr capsule             Additional Screenings Completed Today:

## 2021-06-21 NOTE — PROGRESS NOTES
ASSESSMENT/PLAN:    Mild major depression (H), anxiety  Doing well  PHQ9=2, GAD7=1  No psychotherapy at this time  F/u in 3 months  -     venlafaxine (EFFEXOR-XR) 75 MG 24 hr capsule; Take 1 capsule (75 mg total) by mouth daily.    Primary insomnia  Doing well with melatonin    SUBJECTIVE:    Robyn Cross is a 48 y.o. female who came in today for a medication management visit. She believes the 75mg Effexor has been working well and she is not experiencing any adverse side effects. She takes melatonin to help her fall asleep and stay asleep.    Depression: Patient is not feeling depressed or down today.  PHQ-9 Total Score: 2 (11/14/2018  3:00 PM)    Little interest or pleasure in doing things: Not at all  Feeling down, depressed, or hopeless: Not at all  Trouble falling or staying asleep, or sleeping too much: Several days  Feeling tired or having little energy: Several days  Poor appetite or overeating: Not at all  Feeling bad about yourself - or that you are a failure or have let yourself or your family down: Not at all  Trouble concentrating on things, such as reading the newspaper or watching television: Not at all  Moving or speaking so slowly that other people could have noticed. Or the opposite - being so fidgety or restless that you have been moving around a lot more than usual: Not at all  Thoughts that you would be better off dead, or of hurting yourself in some way: Not at all  PHQ-9 Total Score: 2  If you checked off any problems, how difficult have these problems made it for you to do your work, take care of things at home, or get along with other people?: Not difficult at all    Review of Systems (except those mentioned above)  Constitutional: Negative.   HENT: Negative.   Eyes: Negative.   Respiratory: Negative.   Cardiovascular: Negative.   Gastrointestinal: Negative.   Endocrine: Negative.   Genitourinary: Negative.   Musculoskeletal: Negative.   Skin: Negative.   Allergic/Immunologic: Negative.    Neurological: Negative.   Hematological: Negative.   Psychiatric/Behavioral: Negative.     Patient Active Problem List    Diagnosis Date Noted     Mild major depression (H) 11/14/2018     Anxiety 11/14/2018     Primary insomnia 11/14/2018     Allergic Rhinitis      No Known Allergies  Current Outpatient Medications   Medication Sig Dispense Refill     venlafaxine (EFFEXOR-XR) 75 MG 24 hr capsule Take 1 capsule (75 mg total) by mouth daily. 90 capsule 1     No current facility-administered medications for this visit.      Past Medical History:   Diagnosis Date     Shingles 2016     Past Surgical History:   Procedure Laterality Date     [place] HYSTEROSCOPY,W/ENDOMETRIAL ABLATION      Description: Hysteroscopy With Endometrial Ablation;  Recorded: 07/22/2010;  Comments: 2005     WRIST SURGERY       Social History     Socioeconomic History     Marital status: Single     Spouse name: None     Number of children: None     Years of education: None     Highest education level: None   Social Needs     Financial resource strain: None     Food insecurity - worry: None     Food insecurity - inability: None     Transportation needs - medical: None     Transportation needs - non-medical: None   Occupational History     None   Tobacco Use     Smoking status: Never Smoker     Smokeless tobacco: Never Used   Substance and Sexual Activity     Alcohol use: None     Drug use: None     Sexual activity: None   Other Topics Concern     None   Social History Narrative     None     Family History   Problem Relation Age of Onset     Heart disease Father      CABG Father      Colon cancer Maternal Grandmother      Esophageal cancer Maternal Grandfather      Alzheimer's disease Paternal Grandmother          OBJECTIVE:    Vitals:    11/14/18 1500   BP: 120/68   Patient Site: Left Arm   Patient Position: Sitting   Cuff Size: Adult Regular   Pulse: 70   Weight: 150 lb 4 oz (68.2 kg)     Body mass index is 26.41 kg/m .    Physical Exam:  The patient  has good eye contact.  No psychomotor retardation or stereotypical behaviors.  Speech was regular rate, regular rhythm, adequate responses.  Mood was stable and affect was congruent mood.  No suicidal or homicidal intent.  No hallucination.    Results for orders placed or performed in visit on 09/19/18   Lipid Cascade   Result Value Ref Range    Cholesterol 182 <=199 mg/dL    Triglycerides 78 <=149 mg/dL    HDL Cholesterol 56 >=50 mg/dL    LDL Calculated 110 <=129 mg/dL    Patient Fasting > 8hrs? Yes    Thyroid Cascade   Result Value Ref Range    TSH 1.79 0.30 - 5.00 uIU/mL   Glucose   Result Value Ref Range    Glucose 78 70 - 99 mg/dL    Patient Fasting > 8hrs? Yes    Gynecologic Cytology (PAP Smear)   Result Value Ref Range    Case Report       Gynecologic Cytology Report                       Case: [id number]                                   Authorizing Provider:  Dave Brink         Collected:           09/19/2018 1557                                     MD Ajay                                                                 Ordering Location:     [facility]   Received:            09/19/2018 1557                                     Family Medicine/OB                                                           First Screen:          BIPIN Colmenares                                                                             (ASCP)                                                                       Specimen:    SUREPATH PAP, SCREENING, Endocervical/cervical                                             Interpretation       Negative for squamous intraepithelial lesion or malignancy    Result Flag Normal Normal    Specimen Adequacy       Satisfactory for evaluation, endocervical/transformation zone component present    HPV Reflex? Yes regardless of result     HIGH RISK No     LMP/Menopause Date 8/20/18     Abnormal Bleeding No     Pt Status essure     Birth Control/Hormones None      Previous Normal/Date 2013     Prev Abn Date/Dx none     Cervical Appearance normal    HPV High Risk DNA Cervical   Result Value Ref Range    HPV Source SurePath     HPV16 DNA Negative NEG    HPV18 DNA Negative NEG    Other HR HPV Negative NEG    Final Diagnosis SEE NOTES     Specimen Description Cervical Cells      ADDITIONAL HISTORY SUMMARIZED (2): None.   DECISION TO OBTAIN EXTRA INFORMATION (1): None.   RADIOLOGY TESTS (1): None.  LABS (1): None.  MEDICINE TESTS (1): None.  INDEPENDENT REVIEW (2 each): None.     Total data points = 0    Total time was 4 minutes, greater than 50% counseling and coordinating care regarding the above issues.    By signing my name below, I, Kerline Saenz, attest that this documentation has been prepared under the direction and in the presence of Dr. Keily Brink.  Electronic Signature: Efe Velasco. 11/14/2018 15:15.    I, Dr. Dave Moss MD , personally performed the services described in this documentation. All medical record entries made by the scribe were at my direction and in my presence. I have reviewed the chart and discharge instructions (if applicable) and agree that the record reflects my personal performance and is accurate and complete.

## 2021-06-21 NOTE — PROGRESS NOTES
ASSESSMENT/PLAN:    Mild major depression (H), Anxiety  Effexor started last month.  PHQ 9 score went from 11 to 6.  Nico 7 score went from 9 to 3.  Medication is working patient is still symptomatic.  I will like to increase Effexor to that of 75 mg.  Continue with positive thinking. Motivational interviewing was utilized today.  Modified cognitive behavioral therapy was performed with counseling on internal locus of control.  She will follow-up in 1 month.  -     venlafaxine (EFFEXOR-XR) 75 MG 24 hr capsule; Take 1 capsule (75 mg total) by mouth daily.    Insomnia  She is still struggling to stay asleep.  I advised that she try using melatonin as long as there is still enough time to fall back asleep.  She sleeps about 8+ hours but still feels tired.  May warrant sleep study if still not better with melatonin    SUBJECTIVE:    Robyn Cross is a 48 y.o. female who came in today as a follow-up.  Effexor 37.5 mg was started last month.  She is thinks that overall she is feeling better.  Her mood and anxiety symptoms are not has significant as they were when she saw me last month.  There was a day whereby she forgot to take her Effexor and she said she felt the emotions and irritability during the day.  She has noticed a difference when she takes the medication and not.  No suicidal or homicidal ideation.    He still struggling to stay asleep.  She says that she tried melatonin to fall asleep but realized now that she does not need help with falling asleep.  She states that she is very busy during the day that she can easily fall asleep.  She thinks on average she sleeps at least 8 hours.  Sometimes she sleeps more than 8 hours.  Nonetheless, she still feels very tired during the day.  No knowledge of snoring.  No knowledge of apnea spells.  No history of sleepwalking.    Review of Systems (except those mentioned above)  Constitutional: Negative.   HENT: Negative.   Eyes: Negative.   Respiratory: Negative.    Cardiovascular: Negative.   Gastrointestinal: Negative.   Endocrine: Negative.   Genitourinary: Negative.   Musculoskeletal: Negative.   Skin: Negative.   Allergic/Immunologic: Negative.   Neurological: Negative.   Hematological: Negative.   Psychiatric/Behavioral: Negative.     Patient Active Problem List    Diagnosis Date Noted     Allergic Rhinitis      No Known Allergies  Current Outpatient Prescriptions   Medication Sig Dispense Refill     venlafaxine (EFFEXOR-XR) 75 MG 24 hr capsule Take 1 capsule (75 mg total) by mouth daily. 30 capsule 0     No current facility-administered medications for this visit.      Past Medical History:   Diagnosis Date     Shingles 2016     Past Surgical History:   Procedure Laterality Date     [place] HYSTEROSCOPY,W/ENDOMETRIAL ABLATION      Description: Hysteroscopy With Endometrial Ablation;  Recorded: 07/22/2010;  Comments: 2005     WRIST SURGERY       Social History     Social History     Marital status: Single     Spouse name: N/A     Number of children: N/A     Years of education: N/A     Social History Main Topics     Smoking status: Never Smoker     Smokeless tobacco: Never Used     Alcohol use None     Drug use: None     Sexual activity: Not Asked     Other Topics Concern     None     Social History Narrative     Family History   Problem Relation Age of Onset     Heart disease Father      CABG Father      Colon cancer Maternal Grandmother      Esophageal cancer Maternal Grandfather      Alzheimer's disease Paternal Grandmother          OBJECTIVE:    Vitals:    10/16/18 1500   BP: 118/84   Pulse: 60   Weight: 148 lb 2 oz (67.2 kg)     Body mass index is 26.03 kg/(m^2).    Physical Exam:  The patient has good eye contact.  No psychomotor retardation or stereotypical behaviors.  Speech was regular rate, regular rhythm, adequate responses.  Mood was stable and affect was congruent mood.  No suicidal or homicidal intent.  No hallucination.       Results for orders placed or  performed in visit on 09/19/18   Lipid Cascade   Result Value Ref Range    Cholesterol 182 <=199 mg/dL    Triglycerides 78 <=149 mg/dL    HDL Cholesterol 56 >=50 mg/dL    LDL Calculated 110 <=129 mg/dL    Patient Fasting > 8hrs? Yes    Thyroid Cascade   Result Value Ref Range    TSH 1.79 0.30 - 5.00 uIU/mL   Glucose   Result Value Ref Range    Glucose 78 70 - 99 mg/dL    Patient Fasting > 8hrs? Yes    Gynecologic Cytology (PAP Smear)   Result Value Ref Range    Case Report       Gynecologic Cytology Report                       Case: [id number]                                   Authorizing Provider:  Dave Brink         Collected:           09/19/2018 1557                                     MD Ajay                                                                 Ordering Location:     [facility]   Received:            09/19/2018 1557                                     Family Medicine/OB                                                           First Screen:          BIPIN Colmenares                                                                             (ASCP)                                                                       Specimen:    SUREPATH PAP, SCREENING, Endocervical/cervical                                             Interpretation       Negative for squamous intraepithelial lesion or malignancy    Result Flag Normal Normal    Specimen Adequacy       Satisfactory for evaluation, endocervical/transformation zone component present    HPV Reflex? Yes regardless of result     HIGH RISK No     LMP/Menopause Date 8/20/18     Abnormal Bleeding No     Pt Status essure     Birth Control/Hormones None     Previous Normal/Date 2013     Prev Abn Date/Dx none     Cervical Appearance normal    HPV High Risk DNA Cervical   Result Value Ref Range    HPV Source SurePath     HPV16 DNA Negative NEG    HPV18 DNA Negative NEG    Other HR HPV Negative NEG    Final Diagnosis SEE NOTES      Specimen Description Cervical Cells

## 2021-06-24 NOTE — TELEPHONE ENCOUNTER
RN cannot approve Refill Request    RN can NOT refill this medication overdue for office visits and/or labs.    Ronan Choe, [facility] Triage/Med Refill 3/6/2019    Requested Prescriptions   Pending Prescriptions Disp Refills     venlafaxine (EFFEXOR-XR) 75 MG 24 hr capsule [Pharmacy Med Name: VENLAFAXINE  75MG  CAP  XR] 90 capsule 1     Sig: TAKE 1 CAPSULE BY MOUTH  EVERY DAY    Venlafaxine/Desvenlafaxine Refill Protocol Failed - 3/6/2019  5:45 AM       Failed - LFT or AST or ALT in last year    No results found for: ALBUMIN, BILITOT, BILIDIR, ALKPHOS, AST, ALT, PROT            Passed - Fasting lipid cascade in last year    Cholesterol   Date Value Ref Range Status   09/19/2018 182 <=199 mg/dL Final     Triglycerides   Date Value Ref Range Status   09/19/2018 78 <=149 mg/dL Final     HDL Cholesterol   Date Value Ref Range Status   09/19/2018 56 >=50 mg/dL Final     LDL Calculated   Date Value Ref Range Status   09/19/2018 110 <=129 mg/dL Final     Patient Fasting > 8hrs?   Date Value Ref Range Status   09/19/2018 Yes  Final   09/19/2018 Yes  Final            Passed - PCP or prescribing provider visit in last year    Last office visit with prescriber/PCP: 2/13/2019 Dave Reis MD OR same dept: 2/13/2019 Dave eRis MD OR same specialty: 2/13/2019 Dave Reis MD  Last physical: 9/19/2018 Last MTM visit: Visit date not found   Next visit within 3 mo: Visit date not found  Next physical within 3 mo: Visit date not found  Prescriber OR PCP: Dave Moss MD  Last diagnosis associated with med order: 1. Mild major depression (H)  - venlafaxine (EFFEXOR-XR) 75 MG 24 hr capsule [Pharmacy Med Name: VENLAFAXINE  75MG  CAP  XR]; TAKE 1 CAPSULE BY MOUTH  EVERY DAY  Dispense: 90 capsule; Refill: 1    2. Anxiety  - venlafaxine (EFFEXOR-XR) 75 MG 24 hr capsule [Pharmacy Med Name: VENLAFAXINE  75MG  CAP  XR]; TAKE 1 CAPSULE BY MOUTH  EVERY DAY   Dispense: 90 capsule; Refill: 1    If protocol passes may refill for 12 months if within 3 months of last provider visit (or a total of 15 months).            Passed - Blood Pressure in last year    BP Readings from Last 1 Encounters:   02/13/19 110/64

## 2021-06-24 NOTE — PROGRESS NOTES
"ASSESSMENT/PLAN:    Mild major depression (H), Anxiety  Stable on Effexor 75 mg.  No change in med  Not currently seeing a psychotherapist  Motivational interviewing was utilized today.  Modified cognitive behavioral therapy was performed with counseling on internal locus of control.   Follow-up in 6 months    Primary insomnia  Stable at this time.  She takes melatonin 10 mg.  At times she also adds Advil PM.      SUBJECTIVE:    Robyn Cross is a 48 y.o. female who came in today for a medication management visit. Patient takes Effecor XR (75 mg) daily for depression and anxiety. She explains her symptoms have been a lot better and the medication is working well. Denies nausea or HA's. She is not seeing a therapist. Pt is still struggling with sleep. She takes 5-10 mg of Melatonin each night and reports it working sometimes. She would prefer not to take a prescription sleep medication. Occasionally she will take Advil PM when having aches/pains and sleeps well although she feels \"groggy\" in the morning. Pt takes Vitamin C, Vitamin D, Collagen (for hair/skin/nails), a women's multivitamin, and Glucosamine (for cold-related joint pain) daily.  Little interest or pleasure in doing things: Not at all  Feeling down, depressed, or hopeless: Not at all  Trouble falling or staying asleep, or sleeping too much: More than half the days  Feeling tired or having little energy: Several days  Poor appetite or overeating: Not at all  Feeling bad about yourself - or that you are a failure or have let yourself or your family down: Not at all  Trouble concentrating on things, such as reading the newspaper or watching television: Not at all  Moving or speaking so slowly that other people could have noticed. Or the opposite - being so fidgety or restless that you have been moving around a lot more than usual: Not at all  Thoughts that you would be better off dead, or of hurting yourself in some way: Not at all  PHQ-9 Total Score: " 3  If you checked off any problems, how difficult have these problems made it for you to do your work, take care of things at home, or get along with other people?: Not difficult at all    How difficult did these problems make it for you to do your work, take care of things at home or get along with other people? : Not difficult at all (2019  3:00 PM)  Feeling nervous, anxious, or on edge: 0 (2019  3:00 PM)  Not being able to stop or control worryin (2019  3:00 PM)  Worrying too much about different things: 0 (2019  3:00 PM)  Trouble relaxin (2019  3:00 PM)  Being so restless that it's hard to sit still: 0 (2019  3:00 PM)  Becoming easily annoyed or irritable: 0 (2019  3:00 PM)  Feeling afraid as if something awful might happen: 0 (2019  3:00 PM)  DONTAE 7 Total Score: 0 (2019  3:00 PM)  How difficult did these problems make it for you to do your work, take care of things at home or get along with other people? : Not difficult at all (2019  3:00 PM)    Review of Systems (except those mentioned above)  Constitutional: Negative.   HENT: Negative.   Eyes: Negative.   Respiratory: Negative.   Cardiovascular: Negative.   Gastrointestinal: Negative.   Endocrine: Negative.   Genitourinary: Negative.   Musculoskeletal: Negative.   Skin: Negative.   Allergic/Immunologic: Negative.   Neurological: Negative.   Hematological: Negative.   Psychiatric/Behavioral: Negative.     Patient Active Problem List    Diagnosis Date Noted     Mild major depression (H) 2018     Anxiety 2018     Primary insomnia 2018     Allergic Rhinitis      No Known Allergies  Current Outpatient Medications   Medication Sig Dispense Refill     venlafaxine (EFFEXOR-XR) 75 MG 24 hr capsule Take 1 capsule (75 mg total) by mouth daily. 90 capsule 1     No current facility-administered medications for this visit.      Past Medical History:   Diagnosis Date     Shingles      Past  Surgical History:   Procedure Laterality Date     NV HYSTEROSCOPY,W/ENDOMETRIAL ABLATION      Description: Hysteroscopy With Endometrial Ablation;  Recorded: 07/22/2010;  Comments: 2005     WRIST SURGERY       Social History     Socioeconomic History     Marital status: Single     Spouse name: None     Number of children: None     Years of education: None     Highest education level: None   Social Needs     Financial resource strain: None     Food insecurity - worry: None     Food insecurity - inability: None     Transportation needs - medical: None     Transportation needs - non-medical: None   Occupational History     None   Tobacco Use     Smoking status: Never Smoker     Smokeless tobacco: Never Used   Substance and Sexual Activity     Alcohol use: None     Drug use: None     Sexual activity: None   Other Topics Concern     None   Social History Narrative     None     Family History   Problem Relation Age of Onset     Heart disease Father      CABG Father      Colon cancer Maternal Grandmother      Esophageal cancer Maternal Grandfather      Alzheimer's disease Paternal Grandmother      OBJECTIVE:    Vitals:    02/13/19 1451   BP: 110/64   Patient Site: Left Arm   Patient Position: Sitting   Cuff Size: Adult Regular   Pulse: 68   SpO2: 99%   Weight: 148 lb (67.1 kg)     Body mass index is 26.01 kg/m .    Physical Exam:  Constitutional: Patient is oriented to person, place, and time. Patient appears well-developed and well-nourished. No distress.   Head: Normocephalic and atraumatic.   Right Ear: External ear normal.   Left Ear: External ear normal.   Nose: Nose normal.   Mouth/Throat: Oropharynx is clear and moist. No oropharyngeal exudate.   Eyes: Conjunctivae and EOM are normal. Right eye exhibits no discharge. Left eye exhibits no discharge. No scleral icterus.   Neurological: Patient is alert and oriented to person, place, and time. Patient has normal reflexes. No cranial nerve deficit. Coordination normal.    Skin: No rash noted. Patient is not diaphoretic. No erythema. No pallor.    Results for orders placed or performed in visit on 09/19/18   Lipid Cascade   Result Value Ref Range    Cholesterol 182 <=199 mg/dL    Triglycerides 78 <=149 mg/dL    HDL Cholesterol 56 >=50 mg/dL    LDL Calculated 110 <=129 mg/dL    Patient Fasting > 8hrs? Yes    Thyroid Cascade   Result Value Ref Range    TSH 1.79 0.30 - 5.00 uIU/mL   Glucose   Result Value Ref Range    Glucose 78 70 - 99 mg/dL    Patient Fasting > 8hrs? Yes    Gynecologic Cytology (PAP Smear)   Result Value Ref Range    Case Report       Gynecologic Cytology Report                       Case: [id number]                                   Authorizing Provider:  Dave Brink         Collected:           09/19/2018 Pancho Moss MD                                                                 Ordering Location:     [facility]   Received:            09/19/2018 1557                                     Family Medicine/OB                                                           First Screen:          BIPIN Colmenares                                                                             (ASCP)                                                                       Specimen:    SUREPATH PAP, SCREENING, Endocervical/cervical                                             Interpretation       Negative for squamous intraepithelial lesion or malignancy    Result Flag Normal Normal    Specimen Adequacy       Satisfactory for evaluation, endocervical/transformation zone component present    HPV Reflex? Yes regardless of result     HIGH RISK No     LMP/Menopause Date 8/20/18     Abnormal Bleeding No     Pt Status essure     Birth Control/Hormones None     Previous Normal/Date 2013     Prev Abn Date/Dx none     Cervical Appearance normal    HPV High Risk DNA Cervical   Result Value Ref Range    HPV Source SurePath      HPV16 DNA Negative NEG    HPV18 DNA Negative NEG    Other HR HPV Negative NEG    Final Diagnosis SEE NOTES     Specimen Description Cervical Cells      ADDITIONAL HISTORY SUMMARIZED (2): None.   DECISION TO OBTAIN EXTRA INFORMATION (1): None.   RADIOLOGY TESTS (1): None.  LABS (1): None.  MEDICINE TESTS (1): None.  INDEPENDENT REVIEW (2 each): None.     Total data points = 0    By signing my name below, I, Kerline Saenz, attest that this documentation has been prepared under the direction and in the presence of Dr. Keily Brink.  Electronic Signature: Efe Velasco. 02/13/2019 15:19.    I, Dr. Dave Moss MD , personally performed the services described in this documentation. All medical record entries made by the zanderibruiz were at my direction and in my presence. I have reviewed the chart and discharge instructions (if applicable) and agree that the record reflects my personal performance and is accurate and complete.

## 2021-06-25 NOTE — TELEPHONE ENCOUNTER
RN cannot approve Refill Request    RN can NOT refill this medication PCP messaged that patient is overdue for Labs. Last office visit: 2/13/2019 Dave Reis MD Last Physical: 4/13/2021 Last MTM visit: Visit date not found Last visit same specialty: 2/13/2019 Dave Reis MD.  Next visit within 3 mo: Visit date not found  Next physical within 3 mo: Visit date not found      Negin Luo, [facility] Triage/Med Refill 6/2/2021    Requested Prescriptions   Pending Prescriptions Disp Refills     venlafaxine (EFFEXOR-XR) 75 MG 24 hr capsule 90 capsule 0     Sig: TAKE 1 CAPSULE BY MOUTH  EVERY DAY       Venlafaxine/Desvenlafaxine Refill Protocol Failed - 6/2/2021  3:28 PM        Failed - LFT or AST or ALT in last year     No results found for: ALBUMIN, BILITOT, BILIDIR, ALKPHOS, AST, ALT, PROT             Passed - Fasting lipid cascade in last year     Cholesterol   Date Value Ref Range Status   04/13/2021 198 <=199 mg/dL Final     Triglycerides   Date Value Ref Range Status   04/13/2021 74 <=149 mg/dL Final     HDL Cholesterol   Date Value Ref Range Status   04/13/2021 67 >=50 mg/dL Final     LDL Calculated   Date Value Ref Range Status   04/13/2021 116 <=129 mg/dL Final     Patient Fasting > 8hrs?   Date Value Ref Range Status   04/13/2021 Yes  Final   04/13/2021 Yes  Final             Passed - PCP or prescribing provider visit in last year     Last office visit with prescriber/PCP: 2/13/2019 Dave Reis MD OR same dept: Visit date not found OR same specialty: 2/13/2019 Dave Reis MD  Last physical: 4/13/2021 Last MTM visit: Visit date not found   Next visit within 3 mo: Visit date not found  Next physical within 3 mo: Visit date not found  Prescriber OR PCP: Dave Moss MD  Last diagnosis associated with med order: 1. Mild major depression (H)  - venlafaxine (EFFEXOR-XR) 75 MG 24 hr capsule; TAKE 1 CAPSULE BY MOUTH  EVERY  DAY  Dispense: 90 capsule; Refill: 0    2. Anxiety  - venlafaxine (EFFEXOR-XR) 75 MG 24 hr capsule; TAKE 1 CAPSULE BY MOUTH  EVERY DAY  Dispense: 90 capsule; Refill: 0    If protocol passes may refill for 12 months if within 3 months of last provider visit (or a total of 15 months).             Passed - Blood Pressure in last year     BP Readings from Last 1 Encounters:   04/13/21 116/80

## 2021-06-27 ENCOUNTER — HEALTH MAINTENANCE LETTER (OUTPATIENT)
Age: 51
End: 2021-06-27

## 2021-06-30 NOTE — PROGRESS NOTES
Progress Notes by Dave Reis MD at 4/13/2021 11:20 AM     Author: Dave Reis MD Service: -- Author Type: Physician    Filed: 4/13/2021 12:44 PM Encounter Date: 4/13/2021 Status: Signed    : Dave Reis MD (Physician)       FEMALE PREVENTATIVE EXAM    Assessment and Plan:     Patient has been advised of split billing requirements and indicates understanding: Yes    Robyn was seen today for annual exam.    Routine general medical examination at a health care facility  -     Lipid Cascade  -     Glucose  We discussed healthy lifestyle, nutrition, cardiovascular risk reduction, self care, safety, sunscreen, and timing of cancer screening.  Health maintenance screening and immunizations reviewed with the patient.  Follow up yearly for the annual physical.     Mild major depression (H), Anxiety  Stable on Effexor 75 mg  Follow-up in 6 months    Primary insomnia  -     progesterone (PROMETRIUM) 100 MG capsule; Take 1 capsule (100 mg total) by mouth at bedtime.  Start progesterone  Follow-up in 6 months    Menopausal syndrome (hot flashes)  -     progesterone (PROMETRIUM) 100 MG capsule; Take 1 capsule (100 mg total) by mouth at bedtime.  Start progesterone  Follow-up in 6 months    Immunization Review  Adult Imm Review: She will complete her Covid vaccine and come back at a later time to get her tetanus booster    I discussed the following with the patient:   Adult Healthy Living: Importance of regular exercise  Healthy nutrition  Getting adequate sleep  Stress management    Subjective:   Chief Complaint: Robyn Cross is an 51 y.o. female here for a preventative health visit.    Patient has been advised of split billing requirements and indicates understanding: Yes  HPI:    Difficulty with staying asleep.  Also has been having hot flashes    Healthy Habits  Are you taking a daily aspirin? No  Do you typically exercising at least 40 min, 3-4 times per  "week?  NO  Do you usually eat at least 4 servings of fruit and vegetables a day, include whole grains and fiber and avoid regularly eating high fat foods? Yes  Have you had an eye exam in the past two years? Yes  Do you see a dentist twice per year? Yes  Do you have any concerns regarding sleep? YES    Safety Screen  If you own firearms, are they secured in a locked gun cabinet or with trigger locks? The patient does not own any firearms  No data recorded    Review of Systems:  Please see above.  The rest of the review of systems are negative for all systems.     Pap History:   Yes - updated in Problem List and Health Maintenance accordingly  Cancer Screening       Status Date      MAMMOGRAM Overdue 10/8/2019      Done 10/8/2018 MAMMO SCREENING BILATERAL    PAP SMEAR Next Due 9/19/2023      Done 9/19/2018 GYNECOLOGIC CYTOLOGY (PAP SMEAR)     Patient has more history with this topic...          Patient Care Team:  Dave Reis MD as PCP - General (Family Medicine)  Dave Reis MD as Assigned PCP        History     Reviewed By Date/Time Sections Reviewed    Rufino Randle CMA 4/13/2021 11:27 AM Tobacco            Objective:   Vital Signs:   Visit Vitals  /80 (Patient Site: Left Arm, Patient Position: Sitting, Cuff Size: Adult Regular)   Pulse (!) 59   Ht 5' 3\" (1.6 m)   Wt 144 lb 3 oz (65.4 kg)   LMP 04/10/2021   SpO2 100%   BMI 25.54 kg/m           PHYSICAL EXAM    Objective:    Physical Exam   Vitals:    04/13/21 1127   BP: 116/80   Pulse: (!) 59   SpO2: 100%      Constitutional: Patient is oriented to person, place, and time. Patient appears well-developed and well-nourished. No distress.   Head: Normocephalic and atraumatic.   Right Ear: External ear normal. Normal TM  Left Ear: External ear normal. Normal TM  Eyes: Conjunctivae and EOM are normal. Pupils are equal, round, and reactive to light. Right eye exhibits no discharge. Left eye exhibits no discharge. No scleral " icterus.   Neck: Neck supple. No JVD present. No tracheal deviation present. No thyromegaly present.   Cardiovascular: Normal rate, regular rhythm, normal heart sounds and intact distal pulses. No murmur heard.   Pulmonary/Chest: Effort normal and breath sounds normal. No stridor. No respiratory distress. Patient has no wheezes, no rales, exhibits no tenderness.   Abdominal: Soft. Patient exhibits no distension and no mass. There is no tenderness. There is no rebound and no guarding.   Lymphadenopathy:  Patient has no cervical adenopathy.   Neurological: Patient is alert and oriented to person, place, and time. No cranial nerve deficit. Coordination normal.   Skin: Skin is warm and dry. No rash noted. Patient is not diaphoretic. No erythema. No pallor.   No breast exam    The 10-year ASCVD risk score (W[place] DC Jr., et al., 2013) is: 0.9%    Values used to calculate the score:      Age: 51 years      Sex: Female      Is Non- : No      Diabetic: No      Tobacco smoker: No      Systolic Blood Pressure: 116 mmHg      Is BP treated: No      HDL Cholesterol: 56 mg/dL      Total Cholesterol: 182 mg/dL         Medication List          Accurate as of April 13, 2021 12:43 PM. If you have any questions, ask your nurse or doctor.            START taking these medications    progesterone 100 MG capsule  Also known as: PROMETRIUM  INSTRUCTIONS: Take 1 capsule (100 mg total) by mouth at bedtime.  Started by: Dave Moss MD           CONTINUE taking these medications    venlafaxine 75 MG 24 hr capsule  Also known as: EFFEXOR-XR  INSTRUCTIONS: TAKE 1 CAPSULE BY MOUTH  EVERY DAY              Where to Get Your Medications      These medications were sent to [place] [address] [place] [address]  [address] [facility] [address]    Phone: [phone]     progesterone 100 MG capsule         Additional Screenings Completed Today:

## 2021-10-17 ENCOUNTER — HEALTH MAINTENANCE LETTER (OUTPATIENT)
Age: 51
End: 2021-10-17

## 2021-10-19 PROBLEM — F32.9 MAJOR DEPRESSION: Status: ACTIVE | Noted: 2018-11-14

## 2021-11-04 ENCOUNTER — VIRTUAL VISIT (OUTPATIENT)
Dept: FAMILY MEDICINE | Facility: CLINIC | Age: 51
End: 2021-11-04

## 2021-11-04 DIAGNOSIS — F41.9 ANXIETY: ICD-10-CM

## 2021-11-04 DIAGNOSIS — F32.0 MILD MAJOR DEPRESSION (H): ICD-10-CM

## 2021-11-04 PROCEDURE — 99213 OFFICE O/P EST LOW 20 MIN: CPT | Mod: 95 | Performed by: FAMILY MEDICINE

## 2021-11-04 RX ORDER — VENLAFAXINE HYDROCHLORIDE 75 MG/1
75 CAPSULE, EXTENDED RELEASE ORAL DAILY
Qty: 90 CAPSULE | Refills: 3 | Status: SHIPPED | OUTPATIENT
Start: 2021-11-04

## 2021-11-04 ASSESSMENT — ANXIETY QUESTIONNAIRES
2. NOT BEING ABLE TO STOP OR CONTROL WORRYING: NOT AT ALL
6. BECOMING EASILY ANNOYED OR IRRITABLE: NOT AT ALL
3. WORRYING TOO MUCH ABOUT DIFFERENT THINGS: NOT AT ALL
5. BEING SO RESTLESS THAT IT IS HARD TO SIT STILL: NOT AT ALL
GAD7 TOTAL SCORE: 0
1. FEELING NERVOUS, ANXIOUS, OR ON EDGE: NOT AT ALL
IF YOU CHECKED OFF ANY PROBLEMS ON THIS QUESTIONNAIRE, HOW DIFFICULT HAVE THESE PROBLEMS MADE IT FOR YOU TO DO YOUR WORK, TAKE CARE OF THINGS AT HOME, OR GET ALONG WITH OTHER PEOPLE: NOT DIFFICULT AT ALL
7. FEELING AFRAID AS IF SOMETHING AWFUL MIGHT HAPPEN: NOT AT ALL

## 2021-11-04 ASSESSMENT — PATIENT HEALTH QUESTIONNAIRE - PHQ9
5. POOR APPETITE OR OVEREATING: NOT AT ALL
SUM OF ALL RESPONSES TO PHQ QUESTIONS 1-9: 1

## 2021-11-04 NOTE — PROGRESS NOTES
"Robyn is a 51 year old who is being evaluated via a billable video visit.      How would you like to obtain your AVS? MyChart  If the video visit is dropped, the invitation should be resent by: Text to cell phone: [phone]   Will anyone else be joining your video visit? No      Video Start Time: 11:04 AM    Assessment & Plan     Mild major depression (H), anxiety, hot flashes  Refilled  Follow-up yearly  - venlafaxine (EFFEXOR-XR) 75 MG 24 hr capsule  Dispense: 90 capsule; Refill: 3      Shoua Keily Moss MD  [facility]    Subjective   Robyn is a 51 year old who presents for the following health issues     HPI   Here for med check  \"everything going well\"  Night sweats around \"ovaulation time\".   Stopped P4 because of break out. Not as many hot flashes presently  Menstrual cycles every other month  Doing well with effexor         Objective           Vitals:  No vitals were obtained today due to virtual visit.    Physical Exam   Constitutional: Patient is oriented to person, place, and time. Patient appears well-developed and well-nourished. No distress.   Head: Normocephalic and atraumatic.   Right Ear: External ear normal.   Left Ear: External ear normal.   Eyes: Conjunctivae and EOM are normal. Right eye exhibits no discharge. Left eye exhibits no discharge. No scleral icterus.   Neurological: Patient is alert and oriented to person, place, and time.  Skin: No rash noted. Patient is not diaphoretic. No erythema. No pallor.    Video-Visit Details    Type of service:  Video Visit    Video End Time:11:10 AM    Originating Location (pt. Location): Home    Distant Location (provider location):  [facility]     [facility] for [facility] Visit[facility]    "

## 2021-11-05 ASSESSMENT — ANXIETY QUESTIONNAIRES: GAD7 TOTAL SCORE: 0

## 2022-05-29 ENCOUNTER — HEALTH MAINTENANCE LETTER (OUTPATIENT)
Age: 52
End: 2022-05-29

## 2022-07-24 ENCOUNTER — HEALTH MAINTENANCE LETTER (OUTPATIENT)
Age: 52
End: 2022-07-24

## 2022-10-02 ENCOUNTER — HEALTH MAINTENANCE LETTER (OUTPATIENT)
Age: 52
End: 2022-10-02

## 2023-06-04 ENCOUNTER — HEALTH MAINTENANCE LETTER (OUTPATIENT)
Age: 53
End: 2023-06-04

## 2023-08-12 ENCOUNTER — HEALTH MAINTENANCE LETTER (OUTPATIENT)
Age: 53
End: 2023-08-12

## 2024-07-27 ENCOUNTER — HEALTH MAINTENANCE LETTER (OUTPATIENT)
Age: 54
End: 2024-07-27

## 2025-07-23 ENCOUNTER — OFFICE VISIT (OUTPATIENT)
Dept: URGENT CARE | Facility: URGENT CARE | Age: 55
End: 2025-07-23

## 2025-07-23 VITALS
RESPIRATION RATE: 20 BRPM | HEIGHT: 64 IN | HEART RATE: 66 BPM | OXYGEN SATURATION: 98 % | DIASTOLIC BLOOD PRESSURE: 88 MMHG | SYSTOLIC BLOOD PRESSURE: 130 MMHG | BODY MASS INDEX: 27.14 KG/M2 | WEIGHT: 159 LBS | TEMPERATURE: 97.8 F

## 2025-07-23 DIAGNOSIS — S61.219A LACERATION OF FINGER OF RIGHT HAND, INITIAL ENCOUNTER: Primary | ICD-10-CM

## 2025-07-23 NOTE — PROGRESS NOTES
"Assessment & Plan     Laceration of finger of right hand, initial encounter  Laceration washed and cleaned. Up to date on DTAP.  Laceration was closed with sterile strips.  Advised to keep it dry for 24hrs. Daily bandage change. Monitor closely for infection.           Return in about 5 days (around 7/28/2025), or if symptoms worsen or fail to improve.    Marina Cheek MD  [facility] ANGELA Clark is a 55 year old female who presents to clinic today for the following health issues:  Chief Complaint   Patient presents with    Work Related Injury     Lac Rt pointer finger x today at work. States attempt hit hand on stainless steel while attempt unplugging outlet. Not active bleeding currently.          7/23/2025    11:36 AM   Additional Questions   Roomed by Williams Centeno MA   Accompanied by Self         7/23/2025    11:36 AM   Patient Reported Additional Medications   Patient reports taking the following new medications venlafaxine 150mg and hydrocholorithiazide     HPI  As above.     Last TDAP in 2023.     She was at work when incident happened.     Review of Systems  Constitutional, HEENT, cardiovascular, pulmonary, gi and gu systems are negative, except as otherwise noted.      Objective    /88   Pulse 66   Temp 97.8  F (36.6  C) (Tympanic)   Resp 20   Ht 1.626 m (5' 4\")   Wt 72.1 kg (159 lb)   SpO2 98%   BMI 27.29 kg/m    Physical Exam   GENERAL: alert and no distress  MS: no gross musculoskeletal defects noted, no edema  SKIN:       Laceration to subQ.       "

## 2025-07-23 NOTE — PROGRESS NOTES
Urgent Care Clinic Visit    Chief Complaint   Patient presents with    Work Related Injury     Lac Rt pointer finger x today at work. States attempt hit hand on stainless steel while attempt unplugging outlet. Not active bleeding currently.                7/23/2025    11:36 AM   Additional Questions   Roomed by Williams Centeno MA   Accompanied by Self         7/23/2025    11:36 AM   Patient Reported Additional Medications   Patient reports taking the following new medications venlafaxine 150mg and hydrocholorithiazide           Williams Centeno MA on 07/23/2025 at 11:37 AM

## 2025-08-10 ENCOUNTER — HEALTH MAINTENANCE LETTER (OUTPATIENT)
Age: 55
End: 2025-08-10